# Patient Record
Sex: MALE | URBAN - METROPOLITAN AREA
[De-identification: names, ages, dates, MRNs, and addresses within clinical notes are randomized per-mention and may not be internally consistent; named-entity substitution may affect disease eponyms.]

---

## 2017-02-28 ENCOUNTER — OUTPATIENT (OUTPATIENT)
Dept: OUTPATIENT SERVICES | Facility: HOSPITAL | Age: 62
LOS: 1 days | End: 2017-02-28
Payer: COMMERCIAL

## 2017-02-28 DIAGNOSIS — D69.59 OTHER SECONDARY THROMBOCYTOPENIA: ICD-10-CM

## 2017-02-28 DIAGNOSIS — D72.1 EOSINOPHILIA: ICD-10-CM

## 2017-02-28 LAB
ADP SER-MCNC: NORMAL — SIGNIFICANT CHANGE UP
ADP SER-MCNC: SIGNIFICANT CHANGE UP
COLLAGEN AB SER-ACNC: NORMAL — SIGNIFICANT CHANGE UP
COLLAGEN AB SER-ACNC: SIGNIFICANT CHANGE UP
EPINEPH PLAS-MCNC: SIGNIFICANT CHANGE UP
PA EPINEPH PRP QL: NORMAL — SIGNIFICANT CHANGE UP
RISTOCETIN 1.2 U/ML: SIGNIFICANT CHANGE UP
VWF:RCO PPP-ACNC: SIGNIFICANT CHANGE UP

## 2017-02-28 PROCEDURE — 85576 BLOOD PLATELET AGGREGATION: CPT

## 2017-09-26 ENCOUNTER — OUTPATIENT (OUTPATIENT)
Dept: OUTPATIENT SERVICES | Facility: HOSPITAL | Age: 62
LOS: 1 days | End: 2017-09-26
Payer: COMMERCIAL

## 2017-09-26 ENCOUNTER — RESULT REVIEW (OUTPATIENT)
Age: 62
End: 2017-09-26

## 2017-09-26 PROCEDURE — 88341 IMHCHEM/IMCYTCHM EA ADD ANTB: CPT

## 2017-09-26 PROCEDURE — 38221 DX BONE MARROW BIOPSIES: CPT | Mod: LT

## 2017-09-26 PROCEDURE — 88313 SPECIAL STAINS GROUP 2: CPT

## 2017-09-26 PROCEDURE — 77002 NEEDLE LOCALIZATION BY XRAY: CPT

## 2017-09-26 PROCEDURE — 77002 NEEDLE LOCALIZATION BY XRAY: CPT | Mod: 26

## 2017-09-26 PROCEDURE — 85097 BONE MARROW INTERPRETATION: CPT

## 2017-09-26 PROCEDURE — 38221 DX BONE MARROW BIOPSIES: CPT

## 2017-09-26 PROCEDURE — 88305 TISSUE EXAM BY PATHOLOGIST: CPT

## 2017-09-26 PROCEDURE — 99152 MOD SED SAME PHYS/QHP 5/>YRS: CPT

## 2024-09-25 ENCOUNTER — TRANSCRIPTION ENCOUNTER (OUTPATIENT)
Age: 69
End: 2024-09-25

## 2024-09-25 NOTE — ASU PATIENT PROFILE, ADULT - AVIAN FLU SYMPTOMS
Bill For Surgical Tray: no
Performing Laboratory: [id number]
Expected Date Of Service: 01/31/2024
Billing Type: Third-Party Bill
No

## 2024-09-25 NOTE — ASU PATIENT PROFILE, ADULT - NS PREOP UNDERSTANDS INFO
bring photo id , insurance, credit cards. nothing to eat  or drink from midnight, . no smoking/alcohol/drugs/jewelry/valuables . wear loose comfort clothes. must have an escort. no candy/chewing gum /no milk. address and phone # provided/yes

## 2024-09-26 ENCOUNTER — TRANSCRIPTION ENCOUNTER (OUTPATIENT)
Age: 69
End: 2024-09-26

## 2024-09-26 ENCOUNTER — OUTPATIENT (OUTPATIENT)
Dept: OUTPATIENT SERVICES | Facility: HOSPITAL | Age: 69
LOS: 1 days | Discharge: HOME | End: 2024-09-26
Payer: MEDICARE

## 2024-09-26 VITALS — DIASTOLIC BLOOD PRESSURE: 74 MMHG | SYSTOLIC BLOOD PRESSURE: 148 MMHG

## 2024-09-26 VITALS
HEIGHT: 69 IN | WEIGHT: 148.15 LBS | DIASTOLIC BLOOD PRESSURE: 78 MMHG | OXYGEN SATURATION: 100 % | RESPIRATION RATE: 16 BRPM | SYSTOLIC BLOOD PRESSURE: 137 MMHG | HEART RATE: 50 BPM | TEMPERATURE: 98 F

## 2024-09-26 DIAGNOSIS — Z98.890 OTHER SPECIFIED POSTPROCEDURAL STATES: Chronic | ICD-10-CM

## 2024-09-26 PROCEDURE — 88304 TISSUE EXAM BY PATHOLOGIST: CPT | Mod: 26

## 2024-09-26 RX ORDER — LOSARTAN POTASSIUM 50 MG/1
1 TABLET ORAL
Refills: 0 | DISCHARGE

## 2024-09-26 RX ORDER — ONDANSETRON 2 MG/ML
4 INJECTION, SOLUTION INTRAMUSCULAR; INTRAVENOUS ONCE
Refills: 0 | Status: DISCONTINUED | OUTPATIENT
Start: 2024-09-26 | End: 2024-09-26

## 2024-09-26 RX ORDER — ACETAMINOPHEN 325 MG/1
1000 TABLET ORAL ONCE
Refills: 0 | Status: COMPLETED | OUTPATIENT
Start: 2024-09-26 | End: 2024-09-26

## 2024-09-26 RX ORDER — FENTANYL CITRATE 50 UG/ML
25 INJECTION INTRAMUSCULAR; INTRAVENOUS
Refills: 0 | Status: DISCONTINUED | OUTPATIENT
Start: 2024-09-26 | End: 2024-09-26

## 2024-09-26 RX ADMIN — ACETAMINOPHEN 1000 MILLIGRAM(S): 325 TABLET ORAL at 12:49

## 2024-09-26 NOTE — BRIEF OPERATIVE NOTE - SPECIMENS
Lipoma appearing of left upper arm; Lipoma appearing mass of right upper arm; lipoma appearing mass of forehead/scalp

## 2024-09-26 NOTE — BRIEF OPERATIVE NOTE - OPERATION/FINDINGS
Lipoma appearing of left upper arm; Lipoma appearing mass of right upper arm; lipoma appearing mass of scalp

## 2024-09-26 NOTE — BRIEF OPERATIVE NOTE - NSICDXBRIEFPREOP_GEN_ALL_CORE_FT
PRE-OP DIAGNOSIS:  Lipoma of scalp 26-Sep-2024 20:28:23  Tianna Lux  Lipoma of both upper extremities 26-Sep-2024 20:28:31  Tianna Lux

## 2024-09-26 NOTE — ASU DISCHARGE PLAN (ADULT/PEDIATRIC) - ASU DC SPECIAL INSTRUCTIONSFT
Pain instructions:   Post-surgical pain is expected, take Tylenol 650mg every 6 hours or 1000mg every 8 hours for the first few days after surgery with a maximum 24 hours dose of 4000mg, you can supplement this with Advil 400mg every 6-8 hours staggering them to have sufficient pain control.     You can also use alternate pain control adjuncts such as cooling pad. Do not apply cold directly on skin, have a barrier cloth in place.     General instruction:  - Mobilizing after surgery is important to prevent blood clots, mobilize as much as you can.   - Call your surgeons office tomorrow to set up a follow up appointment       Incision instruction:   - Your incision has surgical glue on top, the surgical glue will scab off over a period of 2 weeks.  - If you have sutures on your incision, these will be taken off by your surgeon or your surgeons assistants in the clinic     Shower/Bathing  - You can shower 24 hours after surgery, pat the incision dry after showering, do not scrub the incision, allow the water to flow over the incisions   - Avoid soaking in bath tubs, hot tubs or swimming pools for a period of 2-3 weeks, till the incision has fully healed and closed

## 2024-09-26 NOTE — BRIEF OPERATIVE NOTE - NSICDXBRIEFPOSTOP_GEN_ALL_CORE_FT
POST-OP DIAGNOSIS:  Lipoma of both upper extremities 26-Sep-2024 20:28:39  Tianna Lux  Lipoma of scalp 26-Sep-2024 20:28:45  Tianna Lux

## 2024-09-26 NOTE — BRIEF OPERATIVE NOTE - NSICDXBRIEFPROCEDURE_GEN_ALL_CORE_FT
PROCEDURES:  Excision, subcutaneous tumor, scalp, 2 cm or greater 26-Sep-2024 20:26:32  Tianna Lux  Excision of soft tissue tumor of upper arm, less than 3 cm 26-Sep-2024 20:27:05  Tianna Lux

## 2024-10-06 LAB — SURGICAL PATHOLOGY STUDY: SIGNIFICANT CHANGE UP

## (undated) DEVICE — SUT MONOCRYL 4-0 18" P-3 UNDYED

## (undated) DEVICE — PREP BETADINE KIT

## (undated) DEVICE — SUT MONOCRYL 4-0 18" PS-2

## (undated) DEVICE — VENODYNE/SCD SLEEVE CALF MEDIUM

## (undated) DEVICE — POSITIONER FOAM EGG CRATE ULNAR 2PCS (PINK)

## (undated) DEVICE — WARMING BLANKET UPPER ADULT

## (undated) DEVICE — PACK GENERAL MINOR

## (undated) DEVICE — ELCTR GROUNDING PAD ADULT COVIDIEN

## (undated) DEVICE — ELCTR PENCIL SMOKE EVACUATOR COATED PUSH BUTTON 70MM

## (undated) DEVICE — DRAPE TOWEL BLUE 17" X 24"

## (undated) DEVICE — ELCTR BOVIE PENCIL BLADE 10FT

## (undated) DEVICE — SUT VICRYL PLUS 3-0 27" SH UNDYED

## (undated) DEVICE — GLV 7.5 PROTEXIS (WHITE)

## (undated) DEVICE — DRAPE APERTURE